# Patient Record
Sex: FEMALE | Race: WHITE | NOT HISPANIC OR LATINO
[De-identification: names, ages, dates, MRNs, and addresses within clinical notes are randomized per-mention and may not be internally consistent; named-entity substitution may affect disease eponyms.]

---

## 2017-07-17 ENCOUNTER — RESULT REVIEW (OUTPATIENT)
Age: 52
End: 2017-07-17

## 2018-08-27 ENCOUNTER — RESULT REVIEW (OUTPATIENT)
Age: 53
End: 2018-08-27

## 2018-08-28 ENCOUNTER — EMERGENCY (EMERGENCY)
Facility: HOSPITAL | Age: 53
LOS: 1 days | Discharge: HOME | End: 2018-08-28
Attending: EMERGENCY MEDICINE
Payer: COMMERCIAL

## 2018-08-28 VITALS
SYSTOLIC BLOOD PRESSURE: 133 MMHG | DIASTOLIC BLOOD PRESSURE: 75 MMHG | RESPIRATION RATE: 18 BRPM | HEART RATE: 82 BPM | OXYGEN SATURATION: 97 % | TEMPERATURE: 98 F

## 2018-08-28 PROCEDURE — 73620 X-RAY EXAM OF FOOT: CPT

## 2018-08-28 PROCEDURE — 73562 X-RAY EXAM OF KNEE 3: CPT

## 2018-08-28 PROCEDURE — 73610 X-RAY EXAM OF ANKLE: CPT

## 2018-08-28 PROCEDURE — 99284 EMERGENCY DEPT VISIT MOD MDM: CPT | Mod: 25

## 2018-08-28 PROCEDURE — 73590 X-RAY EXAM OF LOWER LEG: CPT

## 2018-08-28 PROCEDURE — 99284 EMERGENCY DEPT VISIT MOD MDM: CPT

## 2018-08-28 PROCEDURE — 73552 X-RAY EXAM OF FEMUR 2/>: CPT

## 2018-08-28 PROCEDURE — 93971 EXTREMITY STUDY: CPT

## 2018-08-28 RX ORDER — ACETAMINOPHEN 500 MG
975 TABLET ORAL ONCE
Qty: 0 | Refills: 0 | Status: COMPLETED | OUTPATIENT
Start: 2018-08-28 | End: 2018-08-28

## 2018-08-28 RX ADMIN — Medication 975 MILLIGRAM(S): at 22:38

## 2018-08-28 NOTE — ED PROVIDER NOTE - OBJECTIVE STATEMENT
54 y/o female PMHx GERd on prilosec presented with right knee s/p mechanical fall 11 days ago in between gap of [employer] [place] and [employer] [place].. Patient was able to ambulate following mechanical fall but experiences pain with ambulation. Over next few days knee became swollen and bruised.  Pain initially was mild however has progressed over last few days. Had relief with Aleve and Tylenol. Of note patient recently returned 7 hour trip from [place] 2 days ago. Patient denied presyncopal symptoms, LOC, numbness, parasthesias, dizziness, headache, CP, SOB, abdominal pain, N/V/D

## 2018-08-28 NOTE — ED PROVIDER NOTE - PROGRESS NOTE DETAILS
Attending MD Dougherty: While patient at XR, [facility] transport came to get patient.  Instructed him to pick patient up at XR.  Patient in ED reports has not gone to [facility].  Called [facility], report change in shift but will put her in for transport now. Attending MD Dougherty: Spoke with rads resident, prelim negative no DVT, recommends following up official report.  Patient stable for discharge. Follow up instructions given, importance of follow up emphasized, return to ED parameters reviewed and patient verbalized understanding.  All questions answered, all concerns addressed.

## 2018-08-28 NOTE — ED ADULT NURSE NOTE - NSIMPLEMENTINTERV_GEN_ALL_ED
Implemented All Fall Risk Interventions:  [place] to call system. Call bell, personal items and telephone within reach. Instruct patient to call for assistance. Room bathroom lighting operational. Non-slip footwear when patient is off stretcher. Physically safe environment: no spills, clutter or unnecessary equipment. Stretcher in lowest position, wheels locked, appropriate side rails in place. Provide visual cue, wrist band, yellow gown, etc. Monitor gait and stability. Monitor for mental status changes and reorient to person, place, and time. Review medications for side effects contributing to fall risk. Reinforce activity limits and safety measures with patient and family.

## 2018-08-28 NOTE — ED ADULT NURSE NOTE - OBJECTIVE STATEMENT
52 y/o female presents to ED from home c/o R ankle swelling and pain 11 days /o falling in between gap with train and platform of [employer]. Patient reports increased swelling to R ankle/knee for the past 2 days with new bruising to area. Patient denies SOB, CP, n/v/d, LOC. Patient reports recent travel of 6 hrs in car, kept leg elevated entire trip. Patient able to ambulate upon arrival. Patient resting in bed, side rails up, plan of care explained. Patient took tylenol and advil for pain at 1500 today.

## 2018-08-28 NOTE — ED PROVIDER NOTE - ATTENDING CONTRIBUTION TO CARE
Attending MD Dougherty:   I personally have seen and examined this patient.  Physician assistant note reviewed and agree on plan of care and except where noted.  See below for details.     53F with PMH GERD on Prilosec presents to the ED with R knee pain s/p mechanical fall 11 days ago.  Reports that she fell between the [employer] train and the platform.  Reports has been taking Aleve and Tylenol but pain seems to be worsening instead of improving.  Reports 7 hr trip to [place] two days ago.  Patient is a physician.  Denies chest pain, shortness of breath, palpitations. Denies abdominal pain, nausea, vomiting, diarrhea, blood in stools. Denies fevers, chills, dizziness, weakness. Denies loss of urinary or bowel continence. Denies numbness/weakness/tingling in extremities.  On exam, NAD, head NCAT, PERRL, FROM at neck, no tenderness to palpation or stepoffs along length of spine, lungs CTAB with good inspiratory effort, +S1S2, no m/r/g, abdomen soft with +BS, NT, ND, no CVAT, moving all extremities with 5/5 strength bilateral upper and lower extremities, good and equal  strength bilaterally, R knee with effusion, tenderness to palpation, edema, able to range but with reported pain, +2 DPs; A/P: 53F with R knee pain s/p trauma, will obtain XR RLE, U/S R calf, pain control

## 2018-08-29 VITALS
SYSTOLIC BLOOD PRESSURE: 112 MMHG | OXYGEN SATURATION: 97 % | DIASTOLIC BLOOD PRESSURE: 67 MMHG | HEART RATE: 76 BPM | RESPIRATION RATE: 18 BRPM

## 2018-08-29 PROCEDURE — 73552 X-RAY EXAM OF FEMUR 2/>: CPT | Mod: 26,RT

## 2018-08-29 PROCEDURE — 73620 X-RAY EXAM OF FOOT: CPT | Mod: 26,RT

## 2018-08-29 PROCEDURE — 73610 X-RAY EXAM OF ANKLE: CPT | Mod: 26,RT

## 2018-08-29 PROCEDURE — 93971 EXTREMITY STUDY: CPT | Mod: 26

## 2018-08-29 PROCEDURE — 73590 X-RAY EXAM OF LOWER LEG: CPT | Mod: 26,RT

## 2018-08-29 PROCEDURE — 73562 X-RAY EXAM OF KNEE 3: CPT | Mod: 26,RT

## 2018-08-31 ENCOUNTER — TRANSCRIPTION ENCOUNTER (OUTPATIENT)
Age: 53
End: 2018-08-31

## 2019-10-28 ENCOUNTER — RESULT REVIEW (OUTPATIENT)
Age: 54
End: 2019-10-28

## 2020-12-28 ENCOUNTER — RESULT REVIEW (OUTPATIENT)
Age: 55
End: 2020-12-28

## 2021-09-24 ENCOUNTER — RESULT REVIEW (OUTPATIENT)
Age: 56
End: 2021-09-24

## 2023-01-17 NOTE — ED PROVIDER NOTE - NS ED MD DISPO DISCHARGE CCDA
Doxepin Counseling:  Patient advised that the medication is sedating and not to drive a car after taking this medication. Patient informed of potential adverse effects including but not limited to dry mouth, urinary retention, and blurry vision.  The patient verbalized understanding of the proper use and possible adverse effects of doxepin.  All of the patient's questions and concerns were addressed. Patient/Caregiver provided printed discharge information.